# Patient Record
Sex: MALE | Race: WHITE | HISPANIC OR LATINO | Employment: PART TIME | URBAN - METROPOLITAN AREA
[De-identification: names, ages, dates, MRNs, and addresses within clinical notes are randomized per-mention and may not be internally consistent; named-entity substitution may affect disease eponyms.]

---
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
## 2024-01-29 ENCOUNTER — TELEPHONE (OUTPATIENT)
Dept: FAMILY MEDICINE CLINIC | Facility: CLINIC | Age: 19
End: 2024-01-29

## 2024-01-29 NOTE — TELEPHONE ENCOUNTER
LMOM for patient on Friday 01/26 and today 01/29 - patient to be rescheduled with Dr Martin who took over Dr Holly's patients.

## 2024-02-20 ENCOUNTER — OFFICE VISIT (OUTPATIENT)
Dept: FAMILY MEDICINE CLINIC | Facility: CLINIC | Age: 19
End: 2024-02-20
Payer: COMMERCIAL

## 2024-02-20 VITALS
RESPIRATION RATE: 16 BRPM | SYSTOLIC BLOOD PRESSURE: 96 MMHG | HEIGHT: 66 IN | WEIGHT: 125.8 LBS | HEART RATE: 79 BPM | TEMPERATURE: 97.9 F | DIASTOLIC BLOOD PRESSURE: 60 MMHG | OXYGEN SATURATION: 97 % | BODY MASS INDEX: 20.22 KG/M2

## 2024-02-20 DIAGNOSIS — Z00.00 ANNUAL PHYSICAL EXAM: ICD-10-CM

## 2024-02-20 DIAGNOSIS — Z13.1 SCREENING FOR DIABETES MELLITUS: ICD-10-CM

## 2024-02-20 DIAGNOSIS — R53.83 FATIGUE, UNSPECIFIED TYPE: ICD-10-CM

## 2024-02-20 DIAGNOSIS — R10.13 EPIGASTRIC PAIN: ICD-10-CM

## 2024-02-20 DIAGNOSIS — R10.13 DYSPEPSIA: ICD-10-CM

## 2024-02-20 DIAGNOSIS — R14.0 BLOATING: Primary | ICD-10-CM

## 2024-02-20 DIAGNOSIS — R19.7 DIARRHEA, UNSPECIFIED TYPE: ICD-10-CM

## 2024-02-20 PROCEDURE — 99213 OFFICE O/P EST LOW 20 MIN: CPT | Performed by: FAMILY MEDICINE

## 2024-02-20 PROCEDURE — 99395 PREV VISIT EST AGE 18-39: CPT | Performed by: FAMILY MEDICINE

## 2024-02-20 RX ORDER — FAMOTIDINE 20 MG/1
20 TABLET, FILM COATED ORAL 2 TIMES DAILY
Qty: 120 TABLET | Refills: 2 | Status: SHIPPED | OUTPATIENT
Start: 2024-02-20

## 2024-02-20 NOTE — PROGRESS NOTES
ADULT ANNUAL PHYSICAL  [facility] PRACTICE    NAME: Chele Meyer  AGE: 19 y.o. SEX: male  : 2005     DATE: 2024     Assessment and Plan:     Problem List Items Addressed This Visit       Diarrhea    Relevant Orders    H. pylori antigen, stool    CBC and differential    Ova and parasite examination    Stool Enteric Bacterial Panel by PCR    Calprotectin,Fecal    Celiac Disease Antibody Profile    Bloating - Primary     - Stable on exam.  Continued issue.  Did not follow-up with labs.  -Discussed the importance of getting labs with continued symptoms.  -Start famotidine 20 mg twice daily.  -Antireflux measures discussed and reviewed.  -Has follow-up with GI in the past however did not obtain lab work.  -Follow-up as needed.         Relevant Medications    famotidine (PEPCID) 20 mg tablet    Other Relevant Orders    H. pylori antigen, stool    Ova and parasite examination    Stool Enteric Bacterial Panel by PCR    Calprotectin,Fecal    Celiac Disease Antibody Profile     Other Visit Diagnoses       Epigastric pain        As above.  Labs ordered, including a Celiac Panel, RUQ US, stool cultures, and pt referred to GI given the chronicity of symptoms.  Precautions given.    Relevant Medications    famotidine (PEPCID) 20 mg tablet    Other Relevant Orders    TSH, 3rd generation with Free T4 reflex    H. pylori antigen, stool    CBC and differential    Ova and parasite examination    Stool Enteric Bacterial Panel by PCR    Calprotectin,Fecal    Celiac Disease Antibody Profile    Screening for diabetes mellitus        Relevant Orders    Comprehensive metabolic panel    Fatigue, unspecified type        Dyspepsia        Relevant Orders    TSH, 3rd generation with Free T4 reflex    H. pylori antigen, stool    Ova and parasite examination    Stool Enteric Bacterial Panel by PCR    Calprotectin,Fecal    Annual physical exam                Immunizations and preventive  care screenings were discussed with patient today. Appropriate education was printed on patient's after visit summary.    Counseling:  Alcohol/drug use: discussed moderation in alcohol intake, the recommendations for healthy alcohol use, and avoidance of illicit drug use.  Dental Health: discussed importance of regular tooth brushing, flossing, and dental visits.  Injury prevention: discussed safety/seat belts, safety helmets, smoke detectors, carbon dioxide detectors, and smoking near bedding or upholstery.  Sexual health: discussed sexually transmitted diseases, partner selection, use of condoms, avoidance of unintended pregnancy, and contraceptive alternatives.  Exercise: the importance of regular exercise/physical activity was discussed. Recommend exercise 3-5 times per week for at least 30 minutes.          No follow-ups on file.     Chief Complaint:     Chief Complaint   Patient presents with    Establish Care     Patient being seen to establish care     GI Problem     Patient being seen for lactose intolerance  and feeling bloated x 1 year    Hypotension     Patient being seen for low blood pressure x 6 months      History of Present Illness:     Adult Annual Physical   Patient here for a comprehensive physical exam. The patient reports problems - as above .    Diet and Physical Activity  Diet/Nutrition: poor diet and limited fruits/vegetables.   Exercise: walking.      Depression Screening  PHQ-2/9 Depression Screening           General Health  Sleep: sleeps well and gets 7-8 hours of sleep on average.   Hearing: normal - bilateral.  Vision: goes for regular eye exams.   Dental: regular dental visits and brushes teeth twice daily.        Health  History of STDs?: no.    Advanced Care Planning  Do you have an advanced directive? no  Do you have a durable medical power of ? no  ACP document given to the patient? no     Review of Systems:     Review of Systems   Constitutional:  Negative for chills  and fever.   HENT:  Negative for congestion, ear pain, postnasal drip, rhinorrhea and sore throat.    Eyes:  Negative for pain and visual disturbance.   Respiratory:  Negative for cough and shortness of breath.    Cardiovascular:  Negative for chest pain and palpitations.   Gastrointestinal:  Positive for diarrhea. Negative for abdominal pain and vomiting.        Bloating, reflux   Genitourinary:  Negative for dysuria and hematuria.   Musculoskeletal:  Negative for arthralgias and back pain.   Skin:  Negative for color change and rash.   Neurological:  Negative for dizziness, seizures, syncope and weakness.   Psychiatric/Behavioral:  Negative for confusion and sleep disturbance. The patient is not nervous/anxious.    All other systems reviewed and are negative.     Past Medical History:     History reviewed. No pertinent past medical history.   Past Surgical History:     Past Surgical History:   Procedure Laterality Date    NO PAST SURGERIES      TYMPANOSTOMY TUBE PLACEMENT        Social History:     Social History     Socioeconomic History    Marital status: Single     Spouse name: None    Number of children: None    Years of education: None    Highest education level: None   Occupational History    None   Tobacco Use    Smoking status: Never    Smokeless tobacco: Current     Types: Chew    Tobacco comments:     Crushed tobacco mixed with Marijuana    Vaping Use    Vaping status: Former    Substances: Nicotine   Substance and Sexual Activity    Alcohol use: Not Currently    Drug use: Yes     Types: Marijuana    Sexual activity: Yes     Partners: Female     Birth control/protection: OCP   Other Topics Concern    None   Social History Narrative    None     Social Determinants of Health     Financial Resource Strain: Not on file   Food Insecurity: Not on file   Transportation Needs: Not on file   Physical Activity: Not on file   Stress: Not on file   Social Connections: Not on file   Intimate Partner Violence: Not on  "file   Housing Stability: Not on file      Family History:     Family History   Problem Relation Age of Onset    No Known Problems Mother     Diabetes Father       Current Medications:     Current Outpatient Medications   Medication Sig Dispense Refill    famotidine (PEPCID) 20 mg tablet Take 1 tablet (20 mg total) by mouth 2 (two) times a day 120 tablet 2     No current facility-administered medications for this visit.      Allergies:     No Known Allergies   Physical Exam:     BP 96/60 (BP Location: Left arm, Patient Position: Sitting, Cuff Size: Standard)   Pulse 79   Temp 97.9 °F (36.6 °C) (Tympanic)   Resp 16   Ht 5' 6.46\" (1.688 m)   Wt 57.1 kg (125 lb 12.8 oz)   SpO2 97%   BMI 20.03 kg/m²     Physical Exam  Vitals and nursing note reviewed.   Constitutional:       General: He is not in acute distress.     Appearance: Normal appearance.   HENT:      Head: Normocephalic and atraumatic.      Right Ear: Tympanic membrane and external ear normal.      Left Ear: Tympanic membrane and external ear normal.      Nose: Nose normal.      Mouth/Throat:      Mouth: Mucous membranes are moist.   Eyes:      Extraocular Movements: Extraocular movements intact.      Conjunctiva/sclera: Conjunctivae normal.      Pupils: Pupils are equal, round, and reactive to light.   Cardiovascular:      Rate and Rhythm: Normal rate and regular rhythm.      Pulses: Normal pulses.      Heart sounds: Normal heart sounds. No murmur heard.  Pulmonary:      Effort: Pulmonary effort is normal.      Breath sounds: Normal breath sounds. No wheezing, rhonchi or rales.   Abdominal:      General: Bowel sounds are normal. There is no distension.      Palpations: Abdomen is soft. There is no mass.      Tenderness: There is no abdominal tenderness. There is no guarding or rebound.   Musculoskeletal:         General: Normal range of motion.      Cervical back: Normal range of motion.      Right lower leg: No edema.      Left lower leg: No edema. "   Lymphadenopathy:      Cervical: No cervical adenopathy.   Skin:     General: Skin is warm.      Capillary Refill: Capillary refill takes less than 2 seconds.   Neurological:      General: No focal deficit present.      Mental Status: He is alert and oriented to person, place, and time.   Psychiatric:         Mood and Affect: Mood normal.         Behavior: Behavior normal.          Stalin Martin, DO [facility]

## 2024-02-23 PROBLEM — R14.0 BLOATING: Status: ACTIVE | Noted: 2024-02-23

## 2024-02-23 PROBLEM — R10.11 RUQ PAIN: Status: ACTIVE | Noted: 2024-02-23

## 2024-02-23 PROBLEM — R19.7 DIARRHEA: Status: ACTIVE | Noted: 2024-02-23

## 2024-02-23 NOTE — ASSESSMENT & PLAN NOTE
- Stable on exam.  Continued issue.  Did not follow-up with labs.  -Discussed the importance of getting labs with continued symptoms.  -Start famotidine 20 mg twice daily.  -Antireflux measures discussed and reviewed.  -Has follow-up with GI in the past however did not obtain lab work.  -Follow-up as needed.

## 2024-08-31 ENCOUNTER — APPOINTMENT (EMERGENCY)
Dept: CT IMAGING | Facility: HOSPITAL | Age: 19
End: 2024-08-31
Payer: COMMERCIAL

## 2024-08-31 ENCOUNTER — HOSPITAL ENCOUNTER (EMERGENCY)
Facility: HOSPITAL | Age: 19
Discharge: HOME | End: 2024-08-31
Attending: EMERGENCY MEDICINE | Admitting: EMERGENCY MEDICINE
Payer: COMMERCIAL

## 2024-08-31 VITALS
OXYGEN SATURATION: 97 % | SYSTOLIC BLOOD PRESSURE: 102 MMHG | RESPIRATION RATE: 20 BRPM | HEART RATE: 75 BPM | TEMPERATURE: 97.3 F | DIASTOLIC BLOOD PRESSURE: 57 MMHG

## 2024-08-31 DIAGNOSIS — R11.2 NAUSEA AND VOMITING, UNSPECIFIED VOMITING TYPE: Primary | ICD-10-CM

## 2024-08-31 DIAGNOSIS — R10.10 UPPER ABDOMINAL PAIN: ICD-10-CM

## 2024-08-31 LAB
ALBUMIN SERPL BCG-MCNC: 5.1 G/DL (ref 3.5–5)
ALP SERPL-CCNC: 75 U/L (ref 34–104)
ALT SERPL W P-5'-P-CCNC: 13 U/L (ref 7–52)
ANION GAP SERPL CALCULATED.3IONS-SCNC: 12 MMOL/L (ref 4–13)
AST SERPL W P-5'-P-CCNC: 20 U/L (ref 13–39)
BASOPHILS # BLD MANUAL: 0 THOUSAND/UL (ref 0–0.1)
BASOPHILS NFR MAR MANUAL: 0 % (ref 0–1)
BILIRUB SERPL-MCNC: 0.62 MG/DL (ref 0.2–1)
BUN SERPL-MCNC: 13 MG/DL (ref 5–25)
CALCIUM SERPL-MCNC: 10.2 MG/DL (ref 8.4–10.2)
CHLORIDE SERPL-SCNC: 103 MMOL/L (ref 96–108)
CO2 SERPL-SCNC: 24 MMOL/L (ref 21–32)
CREAT SERPL-MCNC: 0.81 MG/DL (ref 0.6–1.3)
EOSINOPHIL # BLD MANUAL: 0.44 THOUSAND/UL (ref 0–0.4)
EOSINOPHIL NFR BLD MANUAL: 2 % (ref 0–6)
ERYTHROCYTE [DISTWIDTH] IN BLOOD BY AUTOMATED COUNT: 12.7 % (ref 11.6–15.1)
GFR SERPL CREATININE-BSD FRML MDRD: 127 ML/MIN/1.73SQ M
GLUCOSE SERPL-MCNC: 98 MG/DL (ref 65–140)
HCT VFR BLD AUTO: 47.4 % (ref 36.5–49.3)
HGB BLD-MCNC: 15.8 G/DL (ref 12–17)
LG PLATELETS BLD QL SMEAR: PRESENT
LIPASE SERPL-CCNC: 61 U/L (ref 11–82)
LYMPHOCYTES # BLD AUTO: 0.89 THOUSAND/UL (ref 0.6–4.47)
LYMPHOCYTES # BLD AUTO: 4 % (ref 14–44)
MCH RBC QN AUTO: 31.2 PG (ref 26.8–34.3)
MCHC RBC AUTO-ENTMCNC: 33.3 G/DL (ref 31.4–37.4)
MCV RBC AUTO: 94 FL (ref 82–98)
MONOCYTES # BLD AUTO: 0.67 THOUSAND/UL (ref 0–1.22)
MONOCYTES NFR BLD: 3 % (ref 4–12)
NEUTROPHILS # BLD MANUAL: 20.19 THOUSAND/UL (ref 1.85–7.62)
NEUTS BAND NFR BLD MANUAL: 1 % (ref 0–8)
NEUTS SEG NFR BLD AUTO: 90 % (ref 43–75)
PLATELET # BLD AUTO: 234 THOUSANDS/UL (ref 149–390)
PLATELET BLD QL SMEAR: ADEQUATE
PMV BLD AUTO: 11.1 FL (ref 8.9–12.7)
POTASSIUM SERPL-SCNC: 3.9 MMOL/L (ref 3.5–5.3)
PROT SERPL-MCNC: 8.6 G/DL (ref 6.4–8.4)
RBC # BLD AUTO: 5.07 MILLION/UL (ref 3.88–5.62)
RBC MORPH BLD: NORMAL
SODIUM SERPL-SCNC: 139 MMOL/L (ref 135–147)
WBC # BLD AUTO: 22.19 THOUSAND/UL (ref 4.31–10.16)

## 2024-08-31 PROCEDURE — 80053 COMPREHEN METABOLIC PANEL: CPT | Performed by: EMERGENCY MEDICINE

## 2024-08-31 PROCEDURE — 99284 EMERGENCY DEPT VISIT MOD MDM: CPT

## 2024-08-31 PROCEDURE — 83690 ASSAY OF LIPASE: CPT | Performed by: EMERGENCY MEDICINE

## 2024-08-31 PROCEDURE — 96366 THER/PROPH/DIAG IV INF ADDON: CPT

## 2024-08-31 PROCEDURE — 85027 COMPLETE CBC AUTOMATED: CPT | Performed by: EMERGENCY MEDICINE

## 2024-08-31 PROCEDURE — 96375 TX/PRO/DX INJ NEW DRUG ADDON: CPT

## 2024-08-31 PROCEDURE — 96365 THER/PROPH/DIAG IV INF INIT: CPT

## 2024-08-31 PROCEDURE — 96376 TX/PRO/DX INJ SAME DRUG ADON: CPT

## 2024-08-31 PROCEDURE — 85007 BL SMEAR W/DIFF WBC COUNT: CPT | Performed by: EMERGENCY MEDICINE

## 2024-08-31 PROCEDURE — 99285 EMERGENCY DEPT VISIT HI MDM: CPT | Performed by: EMERGENCY MEDICINE

## 2024-08-31 PROCEDURE — 74177 CT ABD & PELVIS W/CONTRAST: CPT

## 2024-08-31 PROCEDURE — 36415 COLL VENOUS BLD VENIPUNCTURE: CPT | Performed by: EMERGENCY MEDICINE

## 2024-08-31 RX ORDER — DROPERIDOL 2.5 MG/ML
0.62 INJECTION, SOLUTION INTRAMUSCULAR; INTRAVENOUS ONCE
Status: COMPLETED | OUTPATIENT
Start: 2024-08-31 | End: 2024-08-31

## 2024-08-31 RX ORDER — HYDROMORPHONE HCL/PF 1 MG/ML
0.5 SYRINGE (ML) INJECTION ONCE
Status: COMPLETED | OUTPATIENT
Start: 2024-08-31 | End: 2024-08-31

## 2024-08-31 RX ORDER — ONDANSETRON 4 MG/1
8 TABLET, FILM COATED ORAL EVERY 8 HOURS PRN
Qty: 15 TABLET | Refills: 3 | Status: SHIPPED | OUTPATIENT
Start: 2024-08-31

## 2024-08-31 RX ADMIN — SODIUM CHLORIDE, SODIUM LACTATE, POTASSIUM CHLORIDE, AND CALCIUM CHLORIDE 1000 ML: .6; .31; .03; .02 INJECTION, SOLUTION INTRAVENOUS at 18:23

## 2024-08-31 RX ADMIN — IOHEXOL 85 ML: 350 INJECTION, SOLUTION INTRAVENOUS at 21:57

## 2024-08-31 RX ADMIN — SODIUM CHLORIDE, SODIUM LACTATE, POTASSIUM CHLORIDE, AND CALCIUM CHLORIDE 500 ML: .6; .31; .03; .02 INJECTION, SOLUTION INTRAVENOUS at 21:15

## 2024-08-31 RX ADMIN — HYDROMORPHONE HYDROCHLORIDE 0.5 MG: 1 INJECTION, SOLUTION INTRAMUSCULAR; INTRAVENOUS; SUBCUTANEOUS at 19:30

## 2024-08-31 RX ADMIN — DROPERIDOL 0.62 MG: 2.5 INJECTION, SOLUTION INTRAMUSCULAR; INTRAVENOUS at 18:18

## 2024-08-31 RX ADMIN — DROPERIDOL 0.62 MG: 2.5 INJECTION, SOLUTION INTRAMUSCULAR; INTRAVENOUS at 19:15

## 2024-08-31 NOTE — ED PROVIDER NOTES
"History  Chief Complaint   Patient presents with    Vomiting     Pt reports sudden onset of \"violent\" vomiting/abd pain 2 hours ago after smoking weed before having BM \"like I usually do\". Pt reports this has happened before but not to this degree.      19 yr  male daily marijuana usage- with approx 2 yrs of abd comps- bloating- intermitent but not frequent non bloody vomitus- in normal sate of health soon after smoking marijuana with nausea- approx 4 episodes of non bloody vomitus upper abd pain --- no recent fevers-   no gu comps- no ill contacts/ travel - no cp/sob- no other drug usage       History provided by:  Patient and friend   used: No    Vomiting  Severity:  Moderate  Duration:  2 hours  Timing:  Intermittent  Quality:  Stomach contents  Associated symptoms: abdominal pain    Associated symptoms: no chills, no diarrhea and no fever        Prior to Admission Medications   Prescriptions Last Dose Informant Patient Reported? Taking?   famotidine (PEPCID) 20 mg tablet   No No   Sig: Take 1 tablet (20 mg total) by mouth 2 (two) times a day      Facility-Administered Medications: None       History reviewed. No pertinent past medical history.    Past Surgical History:   Procedure Laterality Date    NO PAST SURGERIES      TYMPANOSTOMY TUBE PLACEMENT         Family History   Problem Relation Age of Onset    No Known Problems Mother     Diabetes Father      I have reviewed and agree with the history as documented.    E-Cigarette/Vaping    E-Cigarette Use Former User      E-Cigarette/Vaping Substances    Nicotine Yes     THC No     CBD No     Flavoring No     Other No     Unknown No      Social History     Tobacco Use    Smoking status: Never    Smokeless tobacco: Current     Types: Chew    Tobacco comments:     Crushed tobacco mixed with Marijuana    Vaping Use    Vaping status: Former    Substances: Nicotine   Substance Use Topics    Alcohol use: Not Currently    Drug use: Yes     Types: " Marijuana       Review of Systems   Constitutional:  Positive for activity change, appetite change and diaphoresis. Negative for chills, fatigue, fever and unexpected weight change.   HENT: Negative.     Eyes: Negative.    Respiratory: Negative.     Cardiovascular: Negative.    Gastrointestinal:  Positive for abdominal pain, nausea and vomiting. Negative for abdominal distention, anal bleeding, blood in stool, constipation, diarrhea and rectal pain.   Endocrine: Negative.    Genitourinary: Negative.    Musculoskeletal: Negative.    Skin: Negative.    Allergic/Immunologic: Negative.    Neurological: Negative.    Hematological: Negative.    Psychiatric/Behavioral: Negative.         Physical Exam  Physical Exam  Constitutional:       General: He is in acute distress.      Appearance: Normal appearance. He is not ill-appearing, toxic-appearing or diaphoretic.      Comments: Htnsive- actively vomiting- diaphoretic - pulse ox 98 % on ra- interpretation is normal- no intervention    HENT:      Head: Normocephalic and atraumatic.      Nose: Nose normal.      Mouth/Throat:      Mouth: Mucous membranes are moist.   Eyes:      General: No scleral icterus.        Right eye: No discharge.         Left eye: No discharge.      Extraocular Movements: Extraocular movements intact.      Conjunctiva/sclera: Conjunctivae normal.      Pupils: Pupils are equal, round, and reactive to light.      Comments: Mm pink    Neck:      Vascular: No carotid bruit.   Cardiovascular:      Rate and Rhythm: Normal rate and regular rhythm.      Pulses: Normal pulses.      Heart sounds: Normal heart sounds. No murmur heard.     No friction rub. No gallop.   Pulmonary:      Effort: Pulmonary effort is normal. No respiratory distress.      Breath sounds: Normal breath sounds. No stridor. No wheezing, rhonchi or rales.   Chest:      Chest wall: No tenderness.   Abdominal:      General: Bowel sounds are normal. There is no distension.      Palpations:  Abdomen is soft. There is no mass.      Tenderness: There is abdominal tenderness. There is no right CVA tenderness, left CVA tenderness, guarding or rebound.      Hernia: No hernia is present.      Comments: Mild epigastric tenderness-   no cva tenderness- no peritoneal signs    Musculoskeletal:         General: No swelling, tenderness, deformity or signs of injury. Normal range of motion.      Cervical back: Normal range of motion and neck supple. No rigidity or tenderness.      Right lower leg: No edema.      Left lower leg: No edema.      Comments: Equal bilateral radial/dp pulses- no ble edema/calf tenderness/asym/ erythema   Lymphadenopathy:      Cervical: No cervical adenopathy.   Skin:     General: Skin is warm.      Capillary Refill: Capillary refill takes less than 2 seconds.      Coloration: Skin is not jaundiced or pale.      Findings: No bruising, erythema, lesion or rash.   Neurological:      General: No focal deficit present.      Mental Status: He is alert and oriented to person, place, and time. Mental status is at baseline.      Cranial Nerves: No cranial nerve deficit.      Sensory: No sensory deficit.      Motor: No weakness.      Coordination: Coordination normal.      Gait: Gait normal.      Comments: Normal non focal neuro exam   Psychiatric:         Mood and Affect: Mood normal.         Behavior: Behavior normal.         Thought Content: Thought content normal.         Vital Signs  ED Triage Vitals [08/31/24 1723]   Temp Pulse Respirations Blood Pressure SpO2   -- 69 20 148/58 100 %      Temp src Heart Rate Source Patient Position - Orthostatic VS BP Location FiO2 (%)   -- Monitor Sitting Right arm --      Pain Score       8           Vitals:    08/31/24 1723 08/31/24 1730   BP: 148/58 165/93   Pulse: 69 74   Patient Position - Orthostatic VS: Sitting          Visual Acuity      ED Medications  Medications   lactated ringers bolus 1,000 mL (has no administration in time range)   droperidol  (INAPSINE) injection 0.625 mg (0.625 mg Intravenous Given 8/31/24 1818)       Diagnostic Studies  Results Reviewed       Procedure Component Value Units Date/Time    CBC and differential [[id number]]     Lab Status: No result Specimen: Blood     Comprehensive metabolic panel [[id number]]     Lab Status: No result Specimen: Blood     Lipase [[id number]]     Lab Status: No result Specimen: Blood                    No orders to display              Procedures  Procedures         ED Course  ED Course as of 09/01/24 1718   Sat Aug 31, 2024   1920 -er md note- pt re-eval- still c/o of upper abd pain - sill give dose of iv hydromorphone and re-eval   1940 Er md note- 2/24/22 ruq u/s report reviewed by er md   1940 Er md note- pt- re-eval- sound asleep-- labs and er md thought process d/w mother --  will cont to monitor and hydrate and re-eval-- for d/c- verse abd imaging - and admit- mother is in agreement  with tx plan- will con t to re-eval    2032 -er md not-e current sbp 99-- pt awoken- states feels improved with nausea- abd pain- will try po challenge and re-eval    2100 -er md noted- pt- re-eval- took one sip of gingerale- and went back to sleep - no vomitus - will cotn to eval for possible er d/c   2129 Er md note- pt- re-eval - pt again feels much improved- pain and nausea gone-  tolerating po's- on repeat abd exam - soft nt/nd- no peritoneal  signs- discussed dispo with pt/gf and pt mother-- mother and gf are requesting ct scan of abd/pelvis to make sure there is nothing going on - as symptoms have been ongoing-- will order- pt and gf/mother aware that  result will likely next 2-3 hrs    2252 Er md note- pt -re-eval-resting in nad - pt and gf/ mother made aware of waitign for ct scan report- if neg will d/c-- all d/c instructions- and reasons when to return  given  - they verbalize understanding    2320 Er md note- pt - re-margarita- again with no symptoms- no abd pain -- discussed ct findings with pt and family-- as  symptoms have resolved- sb intussception has likely resolved --  will d/c-- made aware of symptoms return might need to come back to er --  they verbalize understanding                                               Medical Decision Making  -  PT WITH RECURRENT SYMPTOMS  BUT WORSE THAN USUAL --  DAILY MARIJUANA USAGE--- PT WILL NEED LABS- SYMPOTOM CONTROL AND RE-EVAL - ND DISCUSSION WITH PT AND  FAMILY     Problems Addressed:  Nausea and vomiting, unspecified vomiting type:     Details: RECURRENT BUT WORSE   Upper abdominal pain:     Details: RECURRENT BUT WORSE     Amount and/or Complexity of Data Reviewed  Independent Historian: parent  External Data Reviewed: radiology.     Details: ALL REVIEWED   Labs: ordered. Decision-making details documented in ED Course.     Details: ALL REVIEWED   Radiology: ordered and independent interpretation performed. Decision-making details documented in ED Course.     Details: REVIEWED BY ER MD  Discussion of management or test interpretation with external provider(s): MODERATE AMOUNT OF ER MD THOUGHT COMPLEXITY AND WORKUP -- ALL SYMPTOMS HAVE RESOLVED  UPON ER D/C-- CT FINDING LIKELY SELF RESOLVED --     Risk  Prescription drug management.  Parenteral controlled substances.  Decision regarding hospitalization.                 Disposition  Final diagnoses:   None     ED Disposition       None          Follow-up Information    None         Patient's Medications   Discharge Prescriptions    No medications on file       No discharge procedures on file.    PDMP Review         Value Time User    PDMP Reviewed  Yes 8/19/2023 11:17 AM Michael Holly DO            ED Provider  Electronically Signed by             Casey Farias MD  09/01/24 9786

## 2024-08-31 NOTE — QUICK NOTE
"            Chief Complaint   Patient presents with    Vomiting     Pt reports sudden onset of \"violent\" vomiting/abd pain 2 hours ago after smoking weed before having BM \"like I usually do\". Pt reports this has happened before but not to this degree.                Vomiting  Associated symptoms: abdominal pain and chills    Associated symptoms: no arthralgias, no cough, no diarrhea, no fever, no headaches, no myalgias and no sore throat         Chele Meyer is a 20 yo M with a PMH significant for regular marijuana use and 1.5 of self resolving nausea/bloating episodes who is presenting with 2-3 hours of acute abdominal pain, nausea, and vomiting. Chele describes sudden onset nausea when he was smoking marijuana earlier today and promptly vomited, which then was followed by diffuse, dull abdominal pain. The abdominal pain is most concentrated in the epigastric region and is described as gnawing. At first it was gastric contents/food but since he has vomited 5-6 times and has now evolved into green/yellow emesis. He took an OTC tablet for heart burn which did not help. At no point was there ever blood. He does not recall any potential sources of food poisoning nor sick contacts. His 1.5 year history of self resolving nausea/bloating led him to going to the GI doctor who had ordered labs for Celiac/IBD which were never done.         Review of Systems   Constitutional:  Positive for appetite change (decreased), chills, diaphoresis and fatigue. Negative for fever.   HENT:  Negative for congestion, rhinorrhea, sinus pressure and sore throat.    Eyes:  Negative for visual disturbance.   Respiratory:  Positive for shortness of breath. Negative for cough and wheezing.    Cardiovascular:  Negative for chest pain and palpitations.   Gastrointestinal:  Positive for abdominal pain, nausea and vomiting. Negative for abdominal distention, constipation and diarrhea.   Genitourinary:  Negative for dysuria, flank pain, hematuria, " scrotal swelling and testicular pain.   Musculoskeletal:  Negative for arthralgias, myalgias, neck pain and neck stiffness.   Skin:  Negative for color change.   Neurological:  Positive for light-headedness. Negative for dizziness and headaches.     Objective:    Vitals:    08/31/24 1723 08/31/24 1730   BP: 148/58 165/93   BP Location: Right arm    Pulse: 69 74   Resp: 20    SpO2: 100% 98%       Physical Exam  Constitutional:       General: He is in acute distress.      Appearance: Normal appearance. He is ill-appearing.   HENT:      Head: Normocephalic and atraumatic.      Mouth/Throat:      Mouth: Mucous membranes are moist.      Pharynx: Oropharynx is clear. Uvula midline. No posterior oropharyngeal erythema.   Eyes:      General: Lids are normal.      Extraocular Movements: Extraocular movements intact.      Conjunctiva/sclera: Conjunctivae normal.      Pupils: Pupils are equal, round, and reactive to light.   Cardiovascular:      Rate and Rhythm: Regular rhythm. Tachycardia present.      Pulses:           Radial pulses are 2+ on the right side and 2+ on the left side.      Heart sounds: Normal heart sounds, S1 normal and S2 normal.   Pulmonary:      Effort: Pulmonary effort is normal.      Breath sounds: Normal breath sounds and air entry.   Abdominal:      General: Bowel sounds are normal. There is no distension.      Palpations: Abdomen is soft. There is no hepatomegaly or splenomegaly.      Tenderness: There is generalized abdominal tenderness (most tender epigastrically). There is no right CVA tenderness, left CVA tenderness, guarding or rebound. Positive signs include psoas sign. Negative signs include Hernandez's sign, Rovsing's sign and obturator sign.   Musculoskeletal:      Cervical back: Full passive range of motion without pain and neck supple.      Right lower leg: No edema.      Left lower leg: No edema.   Skin:     General: Skin is warm.      Capillary Refill: Capillary refill takes less than 2  seconds.      Coloration: Skin is not cyanotic or pale.   Neurological:      Mental Status: He is alert and oriented to person, place, and time.      GCS: GCS eye subscore is 4. GCS verbal subscore is 5. GCS motor subscore is 6.      Cranial Nerves: Cranial nerves 2-12 are intact.      Comments: Motor and sensation grossly intact   Psychiatric:         Behavior: Behavior is cooperative.         No labs, imaging, or micro tests yet        MDM     Chele Meyer is a 20 yo M with a PMH significant for regular marijuana use and 1.5 of self resolving nausea/bloating episodes who is presenting with 2-3 hours of acute abdominal pain, nausea, and vomiting. As far as differential diagnosis, due to his history of regular, chronic marijuana use and history of prior self-resolving vomiting cyclic vomiting due to cannabis is likely. Appendicitis is a consideration due to how it initially presents diffusely, non-specific but he does not have any signs of acute abdomen and several maneuvers were negative. Gastroenteritis is also a consideration as can present with chills and abdominal pain/nausea as well as nephrolithiasis but he does not endorse any  symptoms. A biliary process such as biliary colic is a possibility due to self resolving nature and waxing and waning of abominable pain. Additionally, Hernandez's sign was negative. In a young male with sudden abdominal pain and nausea, testicular torsion should be considered but is less likely given no history of scrotal/testicular pain.      Testing plan  -Vitals monitoring  -CBC/diff  -CMP/LFTs  -Lipase    Treating Plan  -LR bolus 1L  -droperidol for nausea/emesis  -Chloramphenicol lozenge/spray for throat pain  -Can consider additional anti-emetics such as ondansetron, metoclopramide, and benadryl   -See how nausea/vomiting responds, can then advance and see if tolerating clears    Disposition  -Continue to observe and monitor while receiving anti-nausea medication and  fluids    ED Disposition       None          Follow-up Information    None

## 2024-09-01 NOTE — ED NOTES
Patient and family educated on resources available for substance use issues related to patient use of marijuana and provided option for warm handoff. Patient refused warm handoff.     Alesha Bateman RN  08/31/24 2215       Alesha Bateman RN  08/31/24 [id number]

## 2024-09-01 NOTE — DISCHARGE INSTRUCTIONS
Diagnosis: upper abdominal pain with nausea/ vomiting- small bowl intussusception- resolved -  -- possible marijuana hyperemesis  syndrome    - start diet out with frequent sips of liquids- advancing gradually to more solid foods over time     - for any nausea/ vomiting - zofran 2 tablets dissolve in the mouth  every 6- 8 hrs as needed     - for any heartburn indigestion- over the counter generic famotidine- pepcid- 20 mg 1 tablet once to 2 tablets a day     - if this is marijuana hyperemesis syndrome- would recommend stopping daily marijuana use- even after stopping marijuana usage- symptoms can last for several months     - if symptoms continue to persist- please call the gi physicians to schedule an appointment--  [phone]- - you have a referral in the computer     - please return to  the er for any fevers- temp > 100,4/ any worsening/ intractable abdominal pain/ any persistent vomiting with the inability to keep anything down by mouth / any bloody -coffee ground vomiting- any bloody -black tarry stools

## 2024-09-21 ENCOUNTER — APPOINTMENT (EMERGENCY)
Dept: RADIOLOGY | Facility: HOSPITAL | Age: 19
End: 2024-09-21
Payer: OTHER MISCELLANEOUS

## 2024-09-21 ENCOUNTER — HOSPITAL ENCOUNTER (EMERGENCY)
Facility: HOSPITAL | Age: 19
Discharge: HOME | End: 2024-09-21
Attending: EMERGENCY MEDICINE
Payer: OTHER MISCELLANEOUS

## 2024-09-21 VITALS
OXYGEN SATURATION: 98 % | SYSTOLIC BLOOD PRESSURE: 108 MMHG | DIASTOLIC BLOOD PRESSURE: 71 MMHG | WEIGHT: 130 LBS | HEIGHT: 66 IN | HEART RATE: 59 BPM | BODY MASS INDEX: 20.89 KG/M2 | RESPIRATION RATE: 18 BRPM | TEMPERATURE: 98.4 F

## 2024-09-21 DIAGNOSIS — S90.32XA CONTUSION OF LEFT FOOT, INITIAL ENCOUNTER: Primary | ICD-10-CM

## 2024-09-21 DIAGNOSIS — M79.673 FOOT PAIN: ICD-10-CM

## 2024-09-21 PROCEDURE — 99283 EMERGENCY DEPT VISIT LOW MDM: CPT

## 2024-09-21 PROCEDURE — 96372 THER/PROPH/DIAG INJ SC/IM: CPT

## 2024-09-21 PROCEDURE — 99284 EMERGENCY DEPT VISIT MOD MDM: CPT

## 2024-09-21 PROCEDURE — 73630 X-RAY EXAM OF FOOT: CPT

## 2024-09-21 RX ORDER — KETOROLAC TROMETHAMINE 30 MG/ML
15 INJECTION, SOLUTION INTRAMUSCULAR; INTRAVENOUS ONCE
Status: COMPLETED | OUTPATIENT
Start: 2024-09-21 | End: 2024-09-21

## 2024-09-21 RX ORDER — ACETAMINOPHEN 325 MG/1
975 TABLET ORAL ONCE
Status: COMPLETED | OUTPATIENT
Start: 2024-09-21 | End: 2024-09-21

## 2024-09-21 RX ADMIN — KETOROLAC TROMETHAMINE 15 MG: 30 INJECTION, SOLUTION INTRAMUSCULAR; INTRAVENOUS at 02:07

## 2024-09-21 RX ADMIN — ACETAMINOPHEN 975 MG: 325 TABLET ORAL at 02:06

## 2024-09-21 NOTE — ED PROVIDER NOTES
1. Contusion of left foot, initial encounter    2. Foot pain      ED Disposition       ED Disposition   Discharge    Condition   Stable    Date/Time   Sat Sep 21, 2024  2:32 AM    Comment   Chele Meyer discharge to home/self care.                   Assessment & Plan       Medical Decision Making  Patient seen and examined noted to have contusion of left foot, foot pain.  Patient dropped a heavy piece of equipment on his foot at work.  States that it was more swollen and erythematous prior to arrival to the emergency department however he is still having the same amount of pain.  Foot appears mildly swollen compared to right foot and anticipates that patient will have some bruising and swelling however on x-ray there are no fractures or dislocations per my read.  Patient was given Toradol and Tylenol here and was able to ambulate independently.  Strict return precautions were discussed which she expresses understanding of.  Encourage primary care follow-up.    Differential diagnosis includes but is not limited to fracture, dislocation, strain, sprain, contusion.      Imaging revealed:   Mentioned above    Patient appears well, is nontoxic appearing, he expresses understanding and agrees with plan of care at this time.  In light of this patient would benefit from outpatient management.    Patient at time of discharge well-appearing in no acute distress, all questions answered. Patient agreeable to plan.  Patient's vitals, lab/imaging results, diagnosis, and treatment plan were discussed with the patient. All new/changed medications were discussed with patient, specifically, route of administration, how often and when to take, and where they can be picked up. Strict return precautions as well as close follow up with PCP was discussed with the patient and the patient was agreeable to my recommendations. Patient verbally acknowledged understanding of the above communications. Strict return precautions were provided. All  "labs reviewed and utilized in the medical decision making process (if labs were ordered). Portions of the record may have been created with voice recognition software.  Occasional wrong word or \"sound a like\" substitutions may have occurred due to the inherent limitations of voice recognition software.  Read the chart carefully and recognize, using context, where substitutions have occurred.      Amount and/or Complexity of Data Reviewed  Radiology: ordered and independent interpretation performed.    Risk  OTC drugs.  Prescription drug management.                     Medications   ketorolac (TORADOL) injection 15 mg (15 mg Intramuscular Given 9/21/24 0207)   acetaminophen (TYLENOL) tablet 975 mg (975 mg Oral Given 9/21/24 0206)       History of Present Illness       Chele Meyer is a 19 y.o. male with no significant PMH presenting to the ED on September 22, 2024 with foot pain. 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 Patient has been able to ambulate on his foot and states that over the last few hours the swelling has gone down but he is concerned that he broke something as his foot has never hurt this badly. Patient denies bleeding, breaks in the skin, trama to any other extremities or any other complaints at this time.             Review of Systems   Musculoskeletal:  Positive for gait problem and myalgias. Negative for arthralgias and joint swelling.   Skin:  Negative for color change and wound.   Neurological:  Negative for weakness and numbness.           Objective     ED Triage Vitals   Temperature Pulse Blood Pressure Respirations SpO2 Patient Position - Orthostatic VS   09/21/24 0104 09/21/24 0104 09/21/24 0106 09/21/24 0104 09/21/24 0104 --   98.4 °F (36.9 °C) 59 108/71 18 98 %       Temp Source Heart Rate Source BP Location FiO2 (%) Pain Score    09/21/24 0104 -- -- -- 09/21/24 0104    Oral    6  "       Physical Exam  Vitals and nursing note reviewed.   Constitutional:       General: He is not in acute distress.     Appearance: Normal appearance. He is normal weight. He is not ill-appearing, toxic-appearing or diaphoretic.   HENT:      Head: Normocephalic and atraumatic.   Cardiovascular:      Rate and Rhythm: Normal rate and regular rhythm.      Pulses: Normal pulses.      Heart sounds: Normal heart sounds. No murmur heard.     No friction rub. No gallop.      Comments: DP pulses 2+ bilaterally  Pulmonary:      Effort: Pulmonary effort is normal. No respiratory distress.      Breath sounds: Normal breath sounds. No wheezing, rhonchi or rales.   Musculoskeletal:         General: Tenderness and signs of injury present. No swelling or deformity. Normal range of motion.      Cervical back: Normal range of motion and neck supple. No rigidity.   Skin:     General: Skin is warm.      Capillary Refill: Capillary refill takes less than 2 seconds.      Coloration: Skin is not pale.      Findings: No bruising, erythema or rash.   Neurological:      General: No focal deficit present.      Mental Status: He is alert and oriented to person, place, and time. Mental status is at baseline.      Motor: No weakness.      Gait: Gait normal.   Psychiatric:         Mood and Affect: Mood normal.         Behavior: Behavior normal.         Labs Reviewed - No data to display  XR foot 3+ views LEFT   ED Interpretation by Razia Milton PA-C (09/21 8733)   No fractures or dislocations per my read      Final Interpretation by Luke Gonzalez MD (09/21 [id number])      No acute osseous abnormality.         Computerized Assisted Algorithm (CAA) may have been used to analyze all applicable images.         Resident: NERY STOCK I, the attending radiologist, have reviewed the images and agree with the final report above.      Workstation performed: [id number]             Procedures    ED Medication and Procedure Management    Prior to Admission Medications   Prescriptions Last Dose Informant Patient Reported? Taking?   famotidine (PEPCID) 20 mg tablet   No No   Sig: Take 1 tablet (20 mg total) by mouth 2 (two) times a day   ondansetron (Zofran) 4 mg tablet   No No   Sig: Take 2 tablets (8 mg total) by mouth every 8 (eight) hours as needed for nausea or vomiting for up to 15 doses Please ignore previous- zofran 2 tablets dissolve in mouth  every 8 hrs as needed for nausea/ vomiting   ondansetron (Zofran) 4 mg tablet   No No   Sig: Take 2 tablets (8 mg total) by mouth every 8 (eight) hours as needed for nausea or vomiting for up to 15 doses Please ignore previous - zofran odt 2 tablets dissolve in mouth every 8 hrs as needed for nausea/ vomiting      Facility-Administered Medications: None     Discharge Medication List as of 9/21/2024  2:32 AM        CONTINUE these medications which have NOT CHANGED    Details   famotidine (PEPCID) 20 mg tablet Take 1 tablet (20 mg total) by mouth 2 (two) times a day, Starting Tue 2/20/2024, Normal      !! ondansetron (Zofran) 4 mg tablet Take 2 tablets (8 mg total) by mouth every 8 (eight) hours as needed for nausea or vomiting for up to 15 doses Please ignore previous- zofran 2 tablets dissolve in mouth  every 8 hrs as needed for nausea/ vomiting, Starting Sat 8/31/2024, Print      !! ondansetron (Zofran) 4 mg tablet Take 2 tablets (8 mg total) by mouth every 8 (eight) hours as needed for nausea or vomiting for up to 15 doses Please ignore previous - zofran odt 2 tablets dissolve in mouth every 8 hrs as needed for nausea/ vomiting, Starting Sat 8/31/2024, Normal       !! - Potential duplicate medications found. Please discuss with provider.        No discharge procedures on file.     Razia Milton PA-C  09/22/24 1926

## 2024-09-21 NOTE — DISCHARGE INSTRUCTIONS
Encourage you to alternate tylenol and motrin. Please elevate and ice your foot. Bruising may occur over the next few days. Follow up with your primary care. Return to the ER if symptoms worsen.

## 2024-09-21 NOTE — Clinical Note
Chele Meyer was seen and treated in our emergency department on 9/21/2024.                Diagnosis:     Chele  may return to work on return date.    He may return on this date: 09/21/2024         If you have any questions or concerns, please don't hesitate to call.      Razia Milton PA-C    ______________________________           _______________          _______________  Hospital Representative                              Date                                Time

## 2024-09-21 NOTE — Clinical Note
Chele Meyer was seen and treated in our emergency department on 9/21/2024.                Diagnosis:     Chele  may return to work on return date.    He may return on this date: 09/22/2024         If you have any questions or concerns, please don't hesitate to call.      Razia Milton PA-C    ______________________________           _______________          _______________  Hospital Representative                              Date                                Time

## 2025-03-25 ENCOUNTER — OFFICE VISIT (OUTPATIENT)
Dept: FAMILY MEDICINE CLINIC | Facility: CLINIC | Age: 20
End: 2025-03-25
Payer: COMMERCIAL

## 2025-03-25 VITALS
TEMPERATURE: 97.6 F | HEART RATE: 102 BPM | DIASTOLIC BLOOD PRESSURE: 60 MMHG | SYSTOLIC BLOOD PRESSURE: 108 MMHG | WEIGHT: 134 LBS | HEIGHT: 66 IN | BODY MASS INDEX: 21.53 KG/M2 | OXYGEN SATURATION: 98 % | RESPIRATION RATE: 16 BRPM

## 2025-03-25 DIAGNOSIS — Z13.6 ENCOUNTER FOR LIPID SCREENING FOR CARDIOVASCULAR DISEASE: ICD-10-CM

## 2025-03-25 DIAGNOSIS — Z13.1 SCREENING FOR DIABETES MELLITUS (DM): ICD-10-CM

## 2025-03-25 DIAGNOSIS — B35.6 TINEA CRURIS: ICD-10-CM

## 2025-03-25 DIAGNOSIS — Z00.00 ANNUAL PHYSICAL EXAM: Primary | ICD-10-CM

## 2025-03-25 DIAGNOSIS — Z13.220 ENCOUNTER FOR LIPID SCREENING FOR CARDIOVASCULAR DISEASE: ICD-10-CM

## 2025-03-25 PROCEDURE — 99395 PREV VISIT EST AGE 18-39: CPT | Performed by: FAMILY MEDICINE

## 2025-03-25 RX ORDER — CLOTRIMAZOLE AND BETAMETHASONE DIPROPIONATE 10; .64 MG/G; MG/G
CREAM TOPICAL 2 TIMES DAILY
Qty: 45 G | Refills: 1 | Status: SHIPPED | OUTPATIENT
Start: 2025-03-25

## 2025-03-25 NOTE — PATIENT INSTRUCTIONS
"Patient Education     Routine physical for adults   The Basics   Written by the doctors and editors at [facility]   What is a physical? -- A physical is a routine visit, or \"check-up,\" with your doctor. You might also hear it called a \"wellness visit\" or \"preventive visit.\"  During each visit, the doctor will:   Ask about your physical and mental health   Ask about your habits, behaviors, and lifestyle   Do an exam   Give you vaccines if needed   Talk to you about any medicines you take   Give advice about your health   Answer your questions  Getting regular check-ups is an important part of taking care of your health. It can help your doctor find and treat any problems you have. But it's also important for preventing health problems.  A routine physical is different from a \"sick visit.\" A sick visit is when you see a doctor because of a health concern or problem. Since physicals are scheduled ahead of time, you can think about what you want to ask the doctor.  How often should I get a physical? -- It depends on your age and health. In general, for people age 21 years and older:   If you are younger than 50 years, you might be able to get a physical every 3 years.   If you are 50 years or older, your doctor might recommend a physical every year.  If you have an ongoing health condition, like diabetes or high blood pressure, your doctor will probably want to see you more often.  What happens during a physical? -- In general, each visit will include:   Physical exam - The doctor or nurse will check your height, weight, heart rate, and blood pressure. They will also look at your eyes and ears. They will ask about how you are feeling and whether you have any symptoms that bother you.   Medicines - It's a good idea to bring a list of all the medicines you take to each doctor visit. Your doctor will talk to you about your medicines and answer any questions. Tell them if you are having any side effects that bother you. You " "should also tell them if you are having trouble paying for any of your medicines.   Habits and behaviors - This includes:   Your diet   Your exercise habits   Whether you smoke, drink alcohol, or use drugs   Whether you are sexually active   Whether you feel safe at home  Your doctor will talk to you about things you can do to improve your health and lower your risk of health problems. They will also offer help and support. For example, if you want to quit smoking, they can give you advice and might prescribe medicines. If you want to improve your diet or get more physical activity, they can help you with this, too.   Lab tests, if needed - The tests you get will depend on your age and situation. For example, your doctor might want to check your:   Cholesterol   Blood sugar   Iron level   Vaccines - The recommended vaccines will depend on your age, health, and what vaccines you already had. Vaccines are very important because they can prevent certain serious or deadly infections.   Discussion of screening - \"Screening\" means checking for diseases or other health problems before they cause symptoms. Your doctor can recommend screening based on your age, risk, and preferences. This might include tests to check for:   Cancer, such as breast, prostate, cervical, ovarian, colorectal, prostate, lung, or skin cancer   Sexually transmitted infections, such as chlamydia and gonorrhea   Mental health conditions like depression and anxiety  Your doctor will talk to you about the different types of screening tests. They can help you decide which screenings to have. They can also explain what the results might mean.   Answering questions - The physical is a good time to ask the doctor or nurse questions about your health. If needed, they can refer you to other doctors or specialists, too.  Adults older than 65 years often need other care, too. As you get older, your doctor will talk to you about:   How to prevent falling at " home   Hearing or vision tests   Memory testing   How to take your medicines safely   Making sure that you have the help and support you need at home  All topics are updated as new evidence becomes available and our peer review process is complete.  This topic retrieved from [employer] on: May 02, 2024.  Topic 973612 Version 1.0  Release: 32.4.3 - C32.122  © 2024 UpToDate, [employer] and/or its affiliates. All rights reserved.  Consumer Information Use and Disclaimer   Disclaimer: This generalized information is a limited summary of diagnosis, treatment, and/or medication information. It is not meant to be comprehensive and should be used as a tool to help the user understand and/or assess potential diagnostic and treatment options. It does NOT include all information about conditions, treatments, medications, side effects, or risks that may apply to a specific patient. It is not intended to be medical advice or a substitute for the medical advice, diagnosis, or treatment of a health care provider based on the health care provider's examination and assessment of a patient's specific and unique circumstances. Patients must speak with a health care provider for complete information about their health, medical questions, and treatment options, including any risks or benefits regarding use of medications. This information does not endorse any treatments or medications as safe, effective, or approved for treating a specific patient. Up[employer], Inc[employer] and its affiliates disclaim any warranty or liability relating to this information or the use thereof.The use of this information is governed by the Terms of Use, available at https://www.[employer].com/en/know/clinical-effectiveness-terms. 2024© UpToDate, Inc. and its affiliates and/or licensors. All rights reserved.  Copyright   © 2024 [employer]Date, [employer]. and/or its affiliates. All rights reserved.

## 2025-04-01 ENCOUNTER — HOSPITAL ENCOUNTER (EMERGENCY)
Facility: HOSPITAL | Age: 20
Discharge: HOME | End: 2025-04-01
Attending: EMERGENCY MEDICINE
Payer: COMMERCIAL

## 2025-04-01 ENCOUNTER — APPOINTMENT (EMERGENCY)
Dept: CT IMAGING | Facility: HOSPITAL | Age: 20
End: 2025-04-01
Payer: COMMERCIAL

## 2025-04-01 VITALS
DIASTOLIC BLOOD PRESSURE: 59 MMHG | BODY MASS INDEX: 21.19 KG/M2 | OXYGEN SATURATION: 99 % | HEIGHT: 67 IN | TEMPERATURE: 97.9 F | RESPIRATION RATE: 16 BRPM | HEART RATE: 77 BPM | SYSTOLIC BLOOD PRESSURE: 109 MMHG | WEIGHT: 135 LBS

## 2025-04-01 DIAGNOSIS — R11.2 NAUSEA AND VOMITING: Primary | ICD-10-CM

## 2025-04-01 DIAGNOSIS — R10.9 ABDOMINAL PAIN: ICD-10-CM

## 2025-04-01 DIAGNOSIS — D72.829 LEUKOCYTOSIS: ICD-10-CM

## 2025-04-01 LAB
ALBUMIN SERPL BCG-MCNC: 5.4 G/DL (ref 3.5–5)
ALP SERPL-CCNC: 72 U/L (ref 34–104)
ALT SERPL W P-5'-P-CCNC: 10 U/L (ref 7–52)
AMORPH URATE CRY URNS QL MICRO: ABNORMAL
ANION GAP SERPL CALCULATED.3IONS-SCNC: 13 MMOL/L (ref 4–13)
AST SERPL W P-5'-P-CCNC: 16 U/L (ref 13–39)
BACTERIA UR QL AUTO: ABNORMAL /HPF
BASOPHILS # BLD AUTO: 0.1 THOUSANDS/ÂΜL (ref 0–0.1)
BASOPHILS NFR BLD AUTO: 1 % (ref 0–1)
BILIRUB SERPL-MCNC: 0.53 MG/DL (ref 0.2–1)
BILIRUB UR QL STRIP: NEGATIVE
BUN SERPL-MCNC: 17 MG/DL (ref 5–25)
CALCIUM SERPL-MCNC: 10.4 MG/DL (ref 8.4–10.2)
CHLORIDE SERPL-SCNC: 101 MMOL/L (ref 96–108)
CLARITY UR: ABNORMAL
CO2 SERPL-SCNC: 25 MMOL/L (ref 21–32)
COLOR UR: YELLOW
CREAT SERPL-MCNC: 0.79 MG/DL (ref 0.6–1.3)
EOSINOPHIL # BLD AUTO: 0.39 THOUSAND/ÂΜL (ref 0–0.61)
EOSINOPHIL NFR BLD AUTO: 2 % (ref 0–6)
ERYTHROCYTE [DISTWIDTH] IN BLOOD BY AUTOMATED COUNT: 12.4 % (ref 11.6–15.1)
GFR SERPL CREATININE-BSD FRML MDRD: 128 ML/MIN/1.73SQ M
GLUCOSE SERPL-MCNC: 86 MG/DL (ref 65–140)
GLUCOSE UR STRIP-MCNC: NEGATIVE MG/DL
HCT VFR BLD AUTO: 47.5 % (ref 36.5–49.3)
HGB BLD-MCNC: 15.9 G/DL (ref 12–17)
HGB UR QL STRIP.AUTO: NEGATIVE
IMM GRANULOCYTES # BLD AUTO: 0.11 THOUSAND/UL (ref 0–0.2)
IMM GRANULOCYTES NFR BLD AUTO: 1 % (ref 0–2)
KETONES UR STRIP-MCNC: ABNORMAL MG/DL
LEUKOCYTE ESTERASE UR QL STRIP: NEGATIVE
LIPASE SERPL-CCNC: 63 U/L (ref 11–82)
LYMPHOCYTES # BLD AUTO: 2.73 THOUSANDS/ÂΜL (ref 0.6–4.47)
LYMPHOCYTES NFR BLD AUTO: 13 % (ref 14–44)
MCH RBC QN AUTO: 31.3 PG (ref 26.8–34.3)
MCHC RBC AUTO-ENTMCNC: 33.5 G/DL (ref 31.4–37.4)
MCV RBC AUTO: 94 FL (ref 82–98)
MONOCYTES # BLD AUTO: 1.3 THOUSAND/ÂΜL (ref 0.17–1.22)
MONOCYTES NFR BLD AUTO: 6 % (ref 4–12)
NEUTROPHILS # BLD AUTO: 15.78 THOUSANDS/ÂΜL (ref 1.85–7.62)
NEUTS SEG NFR BLD AUTO: 77 % (ref 43–75)
NITRITE UR QL STRIP: NEGATIVE
NON-SQ EPI CELLS URNS QL MICRO: ABNORMAL /HPF
NRBC BLD AUTO-RTO: 0 /100 WBCS
PH UR STRIP.AUTO: 7 [PH]
PLATELET # BLD AUTO: 217 THOUSANDS/UL (ref 149–390)
PMV BLD AUTO: 11.8 FL (ref 8.9–12.7)
POTASSIUM SERPL-SCNC: 3.5 MMOL/L (ref 3.5–5.3)
PROT SERPL-MCNC: 8.6 G/DL (ref 6.4–8.4)
PROT UR STRIP-MCNC: ABNORMAL MG/DL
RBC # BLD AUTO: 5.08 MILLION/UL (ref 3.88–5.62)
RBC #/AREA URNS AUTO: ABNORMAL /HPF
SODIUM SERPL-SCNC: 139 MMOL/L (ref 135–147)
SP GR UR STRIP.AUTO: 1.03 (ref 1–1.03)
UROBILINOGEN UR STRIP-ACNC: <2 MG/DL
WBC # BLD AUTO: 20.41 THOUSAND/UL (ref 4.31–10.16)
WBC #/AREA URNS AUTO: ABNORMAL /HPF

## 2025-04-01 PROCEDURE — 99285 EMERGENCY DEPT VISIT HI MDM: CPT

## 2025-04-01 PROCEDURE — 96375 TX/PRO/DX INJ NEW DRUG ADDON: CPT

## 2025-04-01 PROCEDURE — 74177 CT ABD & PELVIS W/CONTRAST: CPT

## 2025-04-01 PROCEDURE — 99284 EMERGENCY DEPT VISIT MOD MDM: CPT

## 2025-04-01 PROCEDURE — 36415 COLL VENOUS BLD VENIPUNCTURE: CPT

## 2025-04-01 PROCEDURE — 87086 URINE CULTURE/COLONY COUNT: CPT | Performed by: EMERGENCY MEDICINE

## 2025-04-01 PROCEDURE — 83690 ASSAY OF LIPASE: CPT

## 2025-04-01 PROCEDURE — 81001 URINALYSIS AUTO W/SCOPE: CPT

## 2025-04-01 PROCEDURE — 96361 HYDRATE IV INFUSION ADD-ON: CPT

## 2025-04-01 PROCEDURE — 96374 THER/PROPH/DIAG INJ IV PUSH: CPT

## 2025-04-01 PROCEDURE — 85025 COMPLETE CBC W/AUTO DIFF WBC: CPT

## 2025-04-01 PROCEDURE — 80053 COMPREHEN METABOLIC PANEL: CPT

## 2025-04-01 RX ORDER — ONDANSETRON 2 MG/ML
4 INJECTION INTRAMUSCULAR; INTRAVENOUS ONCE
Status: COMPLETED | OUTPATIENT
Start: 2025-04-01 | End: 2025-04-01

## 2025-04-01 RX ORDER — DROPERIDOL 2.5 MG/ML
0.62 INJECTION, SOLUTION INTRAMUSCULAR; INTRAVENOUS ONCE
Status: COMPLETED | OUTPATIENT
Start: 2025-04-01 | End: 2025-04-01

## 2025-04-01 RX ORDER — ONDANSETRON 4 MG/1
4 TABLET, ORALLY DISINTEGRATING ORAL EVERY 6 HOURS PRN
Qty: 12 TABLET | Refills: 0 | Status: SHIPPED | OUTPATIENT
Start: 2025-04-01

## 2025-04-01 RX ORDER — KETOROLAC TROMETHAMINE 30 MG/ML
15 INJECTION, SOLUTION INTRAMUSCULAR; INTRAVENOUS ONCE
Status: COMPLETED | OUTPATIENT
Start: 2025-04-01 | End: 2025-04-01

## 2025-04-01 RX ORDER — FAMOTIDINE 10 MG/ML
20 INJECTION, SOLUTION INTRAVENOUS ONCE
Status: COMPLETED | OUTPATIENT
Start: 2025-04-01 | End: 2025-04-01

## 2025-04-01 RX ADMIN — SODIUM CHLORIDE 1000 ML: 0.9 INJECTION, SOLUTION INTRAVENOUS at 05:44

## 2025-04-01 RX ADMIN — ONDANSETRON 4 MG: 2 INJECTION, SOLUTION INTRAMUSCULAR; INTRAVENOUS at 05:56

## 2025-04-01 RX ADMIN — DROPERIDOL 0.62 MG: 2.5 INJECTION, SOLUTION INTRAMUSCULAR; INTRAVENOUS at 05:56

## 2025-04-01 RX ADMIN — KETOROLAC TROMETHAMINE 15 MG: 30 INJECTION, SOLUTION INTRAMUSCULAR; INTRAVENOUS at 05:58

## 2025-04-01 RX ADMIN — FAMOTIDINE 20 MG: 10 INJECTION INTRAVENOUS at 05:52

## 2025-04-01 RX ADMIN — IOHEXOL 70 ML: 350 INJECTION, SOLUTION INTRAVENOUS at 08:41

## 2025-04-01 NOTE — ED NOTES
Checked with CT to see when they would be ready for patient, per Michael, PT is next.     Nikki Perez RN  04/01/25 0822

## 2025-04-01 NOTE — ED PROVIDER NOTES
ED Disposition       None          Assessment & Plan       Medical Decision Making  20 year old male with a Hx of GERD and marijuana use presenting with epigastric and periumbilical abdominal pain since 1900 with nonbloody vomiting and diarrhea. Patient states he was at work and his symptoms started shortly after eating a meal. Has not been able to tolerate PO. States he last used marijuana ~ 12 hours ago. Had a similar episode on 8/31/24, states his symptoms feel much worse now. On exam, patient in mild distress secondary to pain. Abdominal tenderness noted to epigastric, periumbilical, and RLQ.     DDX including but not limited to: cannabinoid hyperemesis syndrome, gastroenteritis, appendicitis, pancreatitis, cholelithasis    Patient reports signficant improvement with droperidol, zofran, pepcid, toradol, and fluids, resting comfortably. CBC shows WBC 20.41. Lipase WNL. Vitals stable. CMP and CT abd/pelvis pending for final disposition. Patient signed out to Dr. Nate DO for final disposition.      Amount and/or Complexity of Data Reviewed  Labs: ordered.  Radiology: ordered.    Risk  Prescription drug management.        ED Course as of 04/01/25 0709   Tue Apr 01, 2025   0636 Pt reports significant improvement of symptoms with droperidol, zofran, pepcid, fluids. Resting comfortably.     0640 WBC 20.41         Medications   sodium chloride 0.9 % bolus 1,000 mL (1,000 mL Intravenous New Bag 4/1/25 2144)   ondansetron (ZOFRAN) injection 4 mg (4 mg Intravenous Given 4/1/25 0556)   ketorolac (TORADOL) injection 15 mg (15 mg Intravenous Given 4/1/25 0558)   droperidol (INAPSINE) injection 0.625 mg (0.625 mg Intravenous Given 4/1/25 0556)   Famotidine (PF) (PEPCID) injection 20 mg (20 mg Intravenous Given 4/1/25 0552)       ED Risk Strat Scores                                                History of Present Illness       Chief Complaint   Patient presents with    Abdominal Pain     Started with mid to upper  abdominal pain sicne 1900 last night. Also reprots n/v/d       Past Medical History:   Diagnosis Date    Acid reflux       Past Surgical History:   Procedure Laterality Date    NO PAST SURGERIES      TYMPANOSTOMY TUBE PLACEMENT        Family History   Problem Relation Age of Onset    No Known Problems Mother     Diabetes Father       Social History     Tobacco Use    Smoking status: Never    Smokeless tobacco: Former     Types: Chew    Tobacco comments:     Crushed tobacco mixed with Marijuana    Vaping Use    Vaping status: Former    Substances: Nicotine, THC   Substance Use Topics    Alcohol use: Yes     Comment: every 6 months    Drug use: Yes     Types: Marijuana      E-Cigarette/Vaping    E-Cigarette Use Former User       E-Cigarette/Vaping Substances    Nicotine Yes     THC Yes     CBD No     Flavoring No     Other No     Unknown No       I have reviewed and agree with the history as documented.     20 year old male with a Hx of GERD and marijuana use presenting with epigastric and periumbilical abdominal pain since 1900 with nonbloody vomiting and diarrhea. Patient states he was at work and his symptoms started shortly after eating a meal. Has not been able to tolerate PO. States he last used marijuana ~ 12 hours ago. Had a similar episode on 8/31/24, states his symptoms feel much worse now. Has not taken any medications prior to arrival. Denies fevers, congestion, sore throat, chest pain, palpitations, shortness of breath, cough, urinary symptoms, visual changes, or syncope. No known sick contacts.       Abdominal Pain  Associated symptoms: diarrhea, nausea and vomiting    Associated symptoms: no chest pain, no chills, no cough, no dysuria, no fever, no hematuria, no shortness of breath and no sore throat        Review of Systems   Constitutional:  Negative for chills and fever.   HENT:  Negative for congestion and sore throat.    Eyes:  Negative for visual disturbance.   Respiratory:  Negative for cough and  shortness of breath.    Cardiovascular:  Negative for chest pain and palpitations.   Gastrointestinal:  Positive for abdominal pain, diarrhea, nausea and vomiting. Negative for abdominal distention and blood in stool.   Genitourinary:  Negative for dysuria and hematuria.   Musculoskeletal: Negative.    Skin: Negative.    Neurological:  Negative for dizziness, weakness, light-headedness, numbness and headaches.   Psychiatric/Behavioral: Negative.             Objective       ED Triage Vitals   Temperature Pulse Blood Pressure Respirations SpO2 Patient Position - Orthostatic VS   04/01/25 0518 04/01/25 0517 04/01/25 0517 04/01/25 0517 04/01/25 0517 04/01/25 0517   97.9 °F (36.6 °C) 72 131/71 16 99 % Sitting      Temp Source Heart Rate Source BP Location FiO2 (%) Pain Score    04/01/25 0518 04/01/25 0517 04/01/25 0517 -- 04/01/25 0517    Oral Monitor Right arm  9      Vitals      Date and Time Temp Pulse SpO2 Resp BP Pain Score FACES Pain Rating User   04/01/25 0700 -- 77 99 % -- 109/59 -- -- KB   04/01/25 0653 -- 70 97 % -- 111/62 -- -- KB   04/01/25 0621 -- -- -- -- -- 6 -- KB   04/01/25 0615 -- 66 97 % -- 148/68 -- -- KB   04/01/25 0608 -- -- -- -- -- 10 - Worst Possible Pain -- KB   04/01/25 0558 -- -- -- -- -- 9 -- KB   04/01/25 0518 97.9 °F (36.6 °C) -- -- -- -- -- -- DB   04/01/25 0517 -- 72 99 % 16 131/71 9 -- DB            Physical Exam  Vitals reviewed.   Constitutional:       General: He is in acute distress (secondary to pain).      Appearance: He is well-developed and normal weight. He is not toxic-appearing or diaphoretic.   HENT:      Head: Normocephalic and atraumatic.      Mouth/Throat:      Mouth: Mucous membranes are moist.      Pharynx: Oropharynx is clear.   Cardiovascular:      Rate and Rhythm: Normal rate and regular rhythm.      Heart sounds: Normal heart sounds. No murmur heard.  Pulmonary:      Effort: Pulmonary effort is normal.      Breath sounds: Normal breath sounds. No wheezing, rhonchi or  rales.   Abdominal:      General: Abdomen is flat. Bowel sounds are normal. There is no distension.      Palpations: Abdomen is soft.      Tenderness: There is abdominal tenderness in the right lower quadrant, epigastric area and periumbilical area. There is no guarding or rebound. Negative signs include Hernandez's sign.   Skin:     General: Skin is warm and dry.      Capillary Refill: Capillary refill takes less than 2 seconds.   Neurological:      General: No focal deficit present.      Mental Status: He is alert and oriented to person, place, and time.   Psychiatric:         Mood and Affect: Mood normal.         Behavior: Behavior normal.         Results Reviewed       Procedure Component Value Units Date/Time    Lipase [[id number]]  (Normal) Collected: 04/01/25 0601    Lab Status: Final result Specimen: Blood from Arm, Right Updated: 04/01/25 0653     Lipase 63 u/L     CBC and differential [[id number]]  (Abnormal) Collected: 04/01/25 0601    Lab Status: Final result Specimen: Blood from Arm, Right Updated: 04/01/25 0630     WBC 20.41 Thousand/uL      RBC 5.08 Million/uL      Hemoglobin 15.9 g/dL      Hematocrit 47.5 %      MCV 94 fL      MCH 31.3 pg      MCHC 33.5 g/dL      RDW 12.4 %      MPV 11.8 fL      Platelets 217 Thousands/uL      nRBC 0 /100 WBCs      Segmented % 77 %      Immature Grans % 1 %      Lymphocytes % 13 %      Monocytes % 6 %      Eosinophils Relative 2 %      Basophils Relative 1 %      Absolute Neutrophils 15.78 Thousands/µL      Absolute Immature Grans 0.11 Thousand/uL      Absolute Lymphocytes 2.73 Thousands/µL      Absolute Monocytes 1.30 Thousand/µL      Eosinophils Absolute 0.39 Thousand/µL      Basophils Absolute 0.10 Thousands/µL     Comprehensive metabolic panel [[id number]] Collected: 04/01/25 0601    Lab Status: In process Specimen: Blood from Arm, Right Updated: 04/01/25 0604    UA (URINE) with reflex to Scope [[id number]]     Lab Status: No result Specimen: Urine             CT  abdomen pelvis with contrast    (Results Pending)       Procedures    ED Medication and Procedure Management   Prior to Admission Medications   Prescriptions Last Dose Informant Patient Reported? Taking?   clotrimazole-betamethasone (LOTRISONE) 1-0.05 % cream 3/31/2025 Morning  No Yes   Sig: Apply topically 2 (two) times a day   famotidine (PEPCID) 20 mg tablet Not Taking  No No   Sig: Take 1 tablet (20 mg total) by mouth 2 (two) times a day   Patient not taking: Reported on 4/1/2025   ondansetron (Zofran) 4 mg tablet Not Taking  No No   Sig: Take 2 tablets (8 mg total) by mouth every 8 (eight) hours as needed for nausea or vomiting for up to 15 doses Please ignore previous- zofran 2 tablets dissolve in mouth  every 8 hrs as needed for nausea/ vomiting   Patient not taking: Reported on 4/1/2025   ondansetron (Zofran) 4 mg tablet Not Taking  No No   Sig: Take 2 tablets (8 mg total) by mouth every 8 (eight) hours as needed for nausea or vomiting for up to 15 doses Please ignore previous - zofran odt 2 tablets dissolve in mouth every 8 hrs as needed for nausea/ vomiting   Patient not taking: Reported on 4/1/2025      Facility-Administered Medications: None     Patient's Medications   Discharge Prescriptions    No medications on file     No discharge procedures on file.  ED SEPSIS DOCUMENTATION            Luis Ghosh PA-C  04/01/25 0709

## 2025-04-02 LAB — BACTERIA UR CULT: NORMAL

## 2025-05-09 ENCOUNTER — OFFICE VISIT (OUTPATIENT)
Dept: FAMILY MEDICINE CLINIC | Facility: CLINIC | Age: 20
End: 2025-05-09
Payer: COMMERCIAL

## 2025-05-09 VITALS
RESPIRATION RATE: 16 BRPM | SYSTOLIC BLOOD PRESSURE: 102 MMHG | BODY MASS INDEX: 20.78 KG/M2 | HEIGHT: 67 IN | TEMPERATURE: 98 F | WEIGHT: 132.4 LBS | DIASTOLIC BLOOD PRESSURE: 64 MMHG | OXYGEN SATURATION: 97 % | HEART RATE: 87 BPM

## 2025-05-09 DIAGNOSIS — Z83.3 FAMILY HISTORY OF DIABETES MELLITUS: ICD-10-CM

## 2025-05-09 DIAGNOSIS — R30.0 DYSURIA: ICD-10-CM

## 2025-05-09 DIAGNOSIS — R10.9 ABDOMINAL PRESSURE: Primary | ICD-10-CM

## 2025-05-09 LAB
SL AMB  POCT GLUCOSE, UA: NORMAL
SL AMB LEUKOCYTE ESTERASE,UA: NORMAL
SL AMB POCT BILIRUBIN,UA: NORMAL
SL AMB POCT BLOOD,UA: NORMAL
SL AMB POCT CLARITY,UA: CLEAR
SL AMB POCT COLOR,UA: YELLOW
SL AMB POCT KETONES,UA: NORMAL
SL AMB POCT NITRITE,UA: NORMAL
SL AMB POCT PH,UA: 6
SL AMB POCT SPECIFIC GRAVITY,UA: 1.03
SL AMB POCT URINE PROTEIN: NORMAL
SL AMB POCT UROBILINOGEN: NORMAL

## 2025-05-09 PROCEDURE — 99213 OFFICE O/P EST LOW 20 MIN: CPT | Performed by: FAMILY MEDICINE

## 2025-05-09 PROCEDURE — 87086 URINE CULTURE/COLONY COUNT: CPT | Performed by: FAMILY MEDICINE

## 2025-05-09 PROCEDURE — 81002 URINALYSIS NONAUTO W/O SCOPE: CPT | Performed by: FAMILY MEDICINE

## 2025-05-09 NOTE — PROGRESS NOTES
Name: Chele Meyer      : 2005      MRN: [id number]  Encounter Provider: Stalin Martin DO  Encounter Date: 2025   Encounter department: [facility]    Assessment & Plan  Abdominal pressure  - 3 days of loose stools going about 6-9 times a day.  Started Imodium yesterday and has not had another stool.  -Denies fevers, chills, nausea, vomiting.  - Noting symptoms of frequency however when he tries to pee it has been actually just getting into move his stools.  -POCT urine dip negative.  Specific gravity shows dehydration  -Will send for urine culture.  -Discussed likely viral gastroenteritis at this time.  Continue symptomatic management as reviewed.  Continue adequate hydration  -Will get blood work to evaluate any electrolyte abnormalities  -Follow-up as needed.  Orders:  •  POCT urine dip  •  Urine culture  •  Comprehensive metabolic panel; Future  •  CBC and differential; Future    Dysuria    Orders:  •  Urine culture  •  Comprehensive metabolic panel; Future  •  CBC and differential; Future    Family history of diabetes mellitus    Orders:  •  Hemoglobin A1C; Future         History of Present Illness     Chele presents with 3 days of loose stools and some concerns about decreased urination and abdominal pressure.  He notes after having multiple loose stools he had a sensation to be a few times and has found he was unable to start being until he had episode of diarrhea.  Denies any significant burning with urination.  Denies any concern for STIs.  Does note he has not been hydrating or drinking very well.  He may be dehydrated.  He denies any fevers, chills, nausea or vomiting.  Denies any blood in his stool or his urine.  Denies any back pain.  No rash.      Review of Systems   Constitutional:  Negative for chills and fever.   HENT:  Negative for ear pain and sore throat.    Eyes:  Negative for pain and visual disturbance.   Respiratory:  Negative for cough and shortness of  breath.    Cardiovascular:  Negative for chest pain and palpitations.   Gastrointestinal:  Positive for diarrhea. Negative for abdominal pain and vomiting.   Genitourinary:  Positive for difficulty urinating. Negative for dysuria and hematuria.   Musculoskeletal:  Negative for arthralgias and back pain.   Skin:  Negative for color change and rash.   Neurological:  Negative for seizures and syncope.   Psychiatric/Behavioral:  Negative for confusion and sleep disturbance. The patient is not nervous/anxious.    All other systems reviewed and are negative.    Past Medical History:   Diagnosis Date   • Acid reflux    • Urinary tract infection 5/5/2025    difficulty urinating, having to push reallt hard     Past Surgical History:   Procedure Laterality Date   • NO PAST SURGERIES     • TYMPANOSTOMY TUBE PLACEMENT       Family History   Problem Relation Age of Onset   • No Known Problems Mother    • Diabetes Father      Social History     Tobacco Use   • Smoking status: Every Day     Current packs/day: 0.50     Average packs/day: 0.5 packs/day for 5.0 years (2.5 ttl pk-yrs)     Types: Pipe, Cigarettes   • Smokeless tobacco: Never   • Tobacco comments:     Crushed tobacco mixed with Marijuana    Vaping Use   • Vaping status: Former   • Substances: Nicotine, THC   Substance and Sexual Activity   • Alcohol use: Yes     Comment: very rarely   • Drug use: Yes     Types: Marijuana   • Sexual activity: Yes     Partners: Female     Birth control/protection: OCP     Current Outpatient Medications on File Prior to Visit   Medication Sig   • clotrimazole-betamethasone (LOTRISONE) 1-0.05 % cream Apply topically 2 (two) times a day   • famotidine (PEPCID) 20 mg tablet Take 1 tablet (20 mg total) by mouth 2 (two) times a day (Patient not taking: Reported on 3/25/2025)   • [DISCONTINUED] ondansetron (Zofran) 4 mg tablet Take 2 tablets (8 mg total) by mouth every 8 (eight) hours as needed for nausea or vomiting for up to 15 doses Please  "ignore previous- zofran 2 tablets dissolve in mouth  every 8 hrs as needed for nausea/ vomiting (Patient not taking: Reported on 4/1/2025)   • [DISCONTINUED] ondansetron (Zofran) 4 mg tablet Take 2 tablets (8 mg total) by mouth every 8 (eight) hours as needed for nausea or vomiting for up to 15 doses Please ignore previous - zofran odt 2 tablets dissolve in mouth every 8 hrs as needed for nausea/ vomiting (Patient not taking: Reported on 3/25/2025)   • [DISCONTINUED] ondansetron (ZOFRAN-ODT) 4 mg disintegrating tablet Take 1 tablet (4 mg total) by mouth every 6 (six) hours as needed for nausea or vomiting for up to 12 doses     No Known Allergies  Immunization History   Administered Date(s) Administered   • COVID-19 PFIZER VACCINE 0.3 ML IM 08/21/2021, 09/18/2021   • DTaP 5 2004, 2004, 2004, 05/04/2005, 11/25/2008   • H1N1, All Formulations 01/06/2010, 04/07/2010   • HPV9 10/16/2015, 02/14/2017   • Hep B, adult 2004, 2004, 2004, 05/04/2005   • Hib (PRP-OMP) 2004, 2004, 02/01/2005   • INFLUENZA 11/30/2021   • IPV 2004, 2004, 02/01/2005, 11/25/2008   • Influenza Quadrivalent Preservative Free 3 years and older IM 11/01/2016   • Influenza, injectable, quadrivalent, preservative free 0.5 mL 08/27/2020   • Influenza, seasonal, injectable 2004, 2004, 11/05/2007, 10/23/2008, 10/07/2009, 10/29/2010, 10/04/2011, 10/16/2015   • MMR 02/01/2005, 11/25/2008   • Meningococcal C/Y-HIB PRP 01/06/2010, 04/07/2010   • Meningococcal MCV4P 08/27/2020   • Meningococcal, Unknown Serogroups 10/16/2015   • Pneumococcal Conjugate PCV 7 2004, 2004, 2004, 02/01/2005   • Tdap 10/16/2015   • Tuberculin Skin Test-PPD Intradermal 01/04/2005, 11/12/2007   • Varicella 02/01/2005, 11/25/2008     Objective   /64 (BP Location: Left arm, Patient Position: Sitting, Cuff Size: Standard)   Pulse 87   Temp 98 °F (36.7 °C) (Tympanic)   Resp 16   Ht 5' 6.5\" " (1.689 m)   Wt 60.1 kg (132 lb 6.4 oz)   SpO2 97%   BMI 21.05 kg/m²     Physical Exam  Vitals and nursing note reviewed.   Constitutional:       General: He is not in acute distress.     Appearance: Normal appearance.   HENT:      Head: Normocephalic and atraumatic.      Right Ear: Tympanic membrane and external ear normal.      Left Ear: Tympanic membrane and external ear normal.      Nose: Nose normal.      Mouth/Throat:      Mouth: Mucous membranes are moist.   Eyes:      Extraocular Movements: Extraocular movements intact.      Conjunctiva/sclera: Conjunctivae normal.      Pupils: Pupils are equal, round, and reactive to light.   Cardiovascular:      Rate and Rhythm: Normal rate and regular rhythm.      Pulses: Normal pulses.      Heart sounds: Normal heart sounds. No murmur heard.  Pulmonary:      Effort: Pulmonary effort is normal.      Breath sounds: Normal breath sounds. No wheezing, rhonchi or rales.   Abdominal:      General: Bowel sounds are normal. There is no distension.      Palpations: Abdomen is soft.      Tenderness: There is no abdominal tenderness. There is no right CVA tenderness, left CVA tenderness, guarding or rebound.   Musculoskeletal:         General: Normal range of motion.      Cervical back: Normal range of motion.      Right lower leg: No edema.      Left lower leg: No edema.   Lymphadenopathy:      Cervical: No cervical adenopathy.   Skin:     General: Skin is warm.      Capillary Refill: Capillary refill takes less than 2 seconds.   Neurological:      General: No focal deficit present.      Mental Status: He is alert and oriented to person, place, and time.   Psychiatric:         Mood and Affect: Mood normal.         Behavior: Behavior normal.

## 2025-05-10 ENCOUNTER — RESULTS FOLLOW-UP (OUTPATIENT)
Dept: FAMILY MEDICINE CLINIC | Facility: CLINIC | Age: 20
End: 2025-05-10

## 2025-05-10 LAB — BACTERIA UR CULT: NORMAL

## 2025-05-10 NOTE — RESULT ENCOUNTER NOTE
Hello,     All of your blood work has returned with no concerning results.  Please let me know if you have any questions.    Dr. GARCÍA